# Patient Record
Sex: MALE | Race: WHITE | Employment: FULL TIME | URBAN - NONMETROPOLITAN AREA
[De-identification: names, ages, dates, MRNs, and addresses within clinical notes are randomized per-mention and may not be internally consistent; named-entity substitution may affect disease eponyms.]

---

## 2018-07-05 ENCOUNTER — OFFICE VISIT (OUTPATIENT)
Dept: FAMILY MEDICINE CLINIC | Age: 58
End: 2018-07-05
Payer: COMMERCIAL

## 2018-07-05 VITALS
SYSTOLIC BLOOD PRESSURE: 148 MMHG | HEART RATE: 72 BPM | WEIGHT: 207 LBS | HEIGHT: 64 IN | DIASTOLIC BLOOD PRESSURE: 92 MMHG | BODY MASS INDEX: 35.34 KG/M2

## 2018-07-05 DIAGNOSIS — Z12.5 SCREENING PSA (PROSTATE SPECIFIC ANTIGEN): ICD-10-CM

## 2018-07-05 DIAGNOSIS — Z12.11 ENCOUNTER FOR SCREENING COLONOSCOPY FOR NON-HIGH-RISK PATIENT: ICD-10-CM

## 2018-07-05 DIAGNOSIS — Z00.00 ENCOUNTER FOR MEDICAL EXAMINATION TO ESTABLISH CARE: Primary | ICD-10-CM

## 2018-07-05 DIAGNOSIS — E66.09 CLASS 2 OBESITY DUE TO EXCESS CALORIES WITHOUT SERIOUS COMORBIDITY WITH BODY MASS INDEX (BMI) OF 35.0 TO 35.9 IN ADULT: ICD-10-CM

## 2018-07-05 DIAGNOSIS — R10.10 PAIN OF UPPER ABDOMEN: ICD-10-CM

## 2018-07-05 DIAGNOSIS — K42.9 UMBILICAL HERNIA WITHOUT OBSTRUCTION AND WITHOUT GANGRENE: ICD-10-CM

## 2018-07-05 PROCEDURE — 99214 OFFICE O/P EST MOD 30 MIN: CPT | Performed by: FAMILY MEDICINE

## 2018-07-05 RX ORDER — IBUPROFEN 200 MG
600 TABLET ORAL PRN
COMMUNITY

## 2018-07-05 ASSESSMENT — ENCOUNTER SYMPTOMS
ALLERGIC/IMMUNOLOGIC NEGATIVE: 1
RESPIRATORY NEGATIVE: 1
EYES NEGATIVE: 1
ABDOMINAL PAIN: 1

## 2018-07-05 ASSESSMENT — PATIENT HEALTH QUESTIONNAIRE - PHQ9
SUM OF ALL RESPONSES TO PHQ9 QUESTIONS 1 & 2: 0
SUM OF ALL RESPONSES TO PHQ QUESTIONS 1-9: 0
1. LITTLE INTEREST OR PLEASURE IN DOING THINGS: 0
2. FEELING DOWN, DEPRESSED OR HOPELESS: 0

## 2018-07-05 NOTE — PROGRESS NOTES
Subjective:      Patient ID: Tr Cordova is a 59 y.o. male. HPI scheduled visit to establish care. I have reviewed the patient's medical history in detail and updated the computerized patient record. He has some concerns for an abdominal hernia. 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 Rare episodes of worsening pain after eating. Not a consistent event. Bending over sometimes aggravates this. Discomfort can last a couple minutes and then gone. No other acute concerns . Past Medical History:   Diagnosis Date    Borderline hypertension     History of rib fracture around 2000    x3, left chest wall.  Kidney stones     Osteoarthritis of left knee     Plantar fasciitis      Past Surgical History:   Procedure Laterality Date    DENTAL SURGERY      extractions     Current Outpatient Prescriptions   Medication Sig Dispense Refill    ibuprofen (ADVIL;MOTRIN) 200 MG tablet Take 600 mg by mouth as needed for Pain       No current facility-administered medications for this visit. No Known Allergies  Family History   Problem Relation Age of Onset    Diabetes Mother     Coronary Art Dis Mother     Diabetes Father     Coronary Art Dis Father     Colon Cancer Other     Colon Polyps Sister      Social History   Substance Use Topics    Smoking status: Never Smoker    Smokeless tobacco: Never Used    Alcohol use No             Review of Systems   Constitutional: Negative. HENT: Negative. Eyes: Negative. Respiratory: Negative. Cardiovascular: Negative. Gastrointestinal: Positive for abdominal pain (upper abdomen). Endocrine: Negative. Genitourinary: Negative. Musculoskeletal: Positive for arthralgias (left knee pain. intermittent). Skin: Negative. Allergic/Immunologic: Negative. Neurological: Negative. Hematological: Negative. Psychiatric/Behavioral: Negative.         Objective:   Physical Exam

## 2018-07-24 ENCOUNTER — HOSPITAL ENCOUNTER (OUTPATIENT)
Dept: LAB | Age: 58
Setting detail: SPECIMEN
Discharge: HOME | End: 2018-07-24
Payer: COMMERCIAL

## 2018-07-24 DIAGNOSIS — Z00.00 ENCOUNTER FOR MEDICAL EXAMINATION TO ESTABLISH CARE: ICD-10-CM

## 2018-07-24 DIAGNOSIS — R73.9 ELEVATED BLOOD SUGAR: Primary | ICD-10-CM

## 2018-07-24 DIAGNOSIS — Z12.5 SCREENING PSA (PROSTATE SPECIFIC ANTIGEN): ICD-10-CM

## 2018-07-24 LAB
ABSOLUTE EOS #: 0.4 K/UL (ref 0–0.4)
ABSOLUTE IMMATURE GRANULOCYTE: ABNORMAL K/UL (ref 0–0.3)
ABSOLUTE LYMPH #: 1.5 K/UL (ref 1–4.8)
ABSOLUTE MONO #: 0.6 K/UL (ref 0.1–1.2)
ALBUMIN SERPL-MCNC: 4 G/DL (ref 3.5–5.2)
ALBUMIN/GLOBULIN RATIO: 1.2 (ref 1–2.5)
ALP BLD-CCNC: 74 U/L (ref 40–129)
ALT SERPL-CCNC: 15 U/L (ref 5–41)
ANION GAP SERPL CALCULATED.3IONS-SCNC: 11 MMOL/L (ref 9–17)
AST SERPL-CCNC: 20 U/L
BASOPHILS # BLD: 1 % (ref 0–2)
BASOPHILS ABSOLUTE: 0.1 K/UL (ref 0–0.2)
BILIRUB SERPL-MCNC: 1.18 MG/DL (ref 0.3–1.2)
BUN BLDV-MCNC: 13 MG/DL (ref 6–20)
BUN/CREAT BLD: 16 (ref 9–20)
CALCIUM SERPL-MCNC: 9.2 MG/DL (ref 8.6–10.4)
CHLORIDE BLD-SCNC: 104 MMOL/L (ref 98–107)
CHOLESTEROL/HDL RATIO: 5
CHOLESTEROL: 217 MG/DL
CO2: 29 MMOL/L (ref 20–31)
CREAT SERPL-MCNC: 0.81 MG/DL (ref 0.7–1.2)
DIFFERENTIAL TYPE: ABNORMAL
EOSINOPHILS RELATIVE PERCENT: 6 % (ref 1–8)
ESTIMATED AVERAGE GLUCOSE: 120 MG/DL
GFR AFRICAN AMERICAN: >60 ML/MIN
GFR NON-AFRICAN AMERICAN: >60 ML/MIN
GFR SERPL CREATININE-BSD FRML MDRD: ABNORMAL ML/MIN/{1.73_M2}
GFR SERPL CREATININE-BSD FRML MDRD: ABNORMAL ML/MIN/{1.73_M2}
GLUCOSE BLD-MCNC: 116 MG/DL (ref 70–99)
HBA1C MFR BLD: 5.8 % (ref 4.8–5.9)
HCT VFR BLD CALC: 49.8 % (ref 41–53)
HDLC SERPL-MCNC: 43 MG/DL
HEMOGLOBIN: 17.3 G/DL (ref 13.5–17.5)
IMMATURE GRANULOCYTES: ABNORMAL %
LDL CHOLESTEROL: 155 MG/DL (ref 0–130)
LYMPHOCYTES # BLD: 23 % (ref 15–43)
MCH RBC QN AUTO: 34.1 PG (ref 26–34)
MCHC RBC AUTO-ENTMCNC: 34.8 G/DL (ref 31–37)
MCV RBC AUTO: 98.1 FL (ref 80–100)
MONOCYTES # BLD: 9 % (ref 6–14)
NRBC AUTOMATED: ABNORMAL PER 100 WBC
PDW BLD-RTO: 13.5 % (ref 11–14.5)
PLATELET # BLD: 304 K/UL (ref 140–450)
PLATELET ESTIMATE: ABNORMAL
PMV BLD AUTO: 8.8 FL (ref 6–12)
POTASSIUM SERPL-SCNC: 4 MMOL/L (ref 3.7–5.3)
PROSTATE SPECIFIC ANTIGEN: 0.36 UG/L
RBC # BLD: 5.08 M/UL (ref 4.5–5.9)
RBC # BLD: ABNORMAL 10*6/UL
SEG NEUTROPHILS: 61 % (ref 44–74)
SEGMENTED NEUTROPHILS ABSOLUTE COUNT: 4 K/UL (ref 1.8–7.7)
SODIUM BLD-SCNC: 144 MMOL/L (ref 135–144)
TOTAL PROTEIN: 7.4 G/DL (ref 6.4–8.3)
TRIGL SERPL-MCNC: 93 MG/DL
VLDLC SERPL CALC-MCNC: ABNORMAL MG/DL (ref 1–30)
WBC # BLD: 6.6 K/UL (ref 3.5–11)
WBC # BLD: ABNORMAL 10*3/UL

## 2018-07-24 PROCEDURE — 80053 COMPREHEN METABOLIC PANEL: CPT

## 2018-07-24 PROCEDURE — 85025 COMPLETE CBC W/AUTO DIFF WBC: CPT

## 2018-07-24 PROCEDURE — 80061 LIPID PANEL: CPT

## 2018-07-24 PROCEDURE — G0103 PSA SCREENING: HCPCS

## 2018-07-24 PROCEDURE — 36415 COLL VENOUS BLD VENIPUNCTURE: CPT

## 2018-07-24 PROCEDURE — 83036 HEMOGLOBIN GLYCOSYLATED A1C: CPT

## 2018-08-07 ENCOUNTER — INITIAL CONSULT (OUTPATIENT)
Dept: SURGERY | Age: 58
End: 2018-08-07
Payer: COMMERCIAL

## 2018-08-07 ENCOUNTER — TELEPHONE (OUTPATIENT)
Dept: SURGERY | Age: 58
End: 2018-08-07

## 2018-08-07 VITALS
HEART RATE: 72 BPM | SYSTOLIC BLOOD PRESSURE: 152 MMHG | BODY MASS INDEX: 34.22 KG/M2 | TEMPERATURE: 98.3 F | WEIGHT: 205.4 LBS | DIASTOLIC BLOOD PRESSURE: 78 MMHG | HEIGHT: 65 IN

## 2018-08-07 DIAGNOSIS — Z12.11 SCREENING FOR COLON CANCER: Primary | ICD-10-CM

## 2018-08-07 DIAGNOSIS — K42.9 UMBILICAL HERNIA WITHOUT OBSTRUCTION AND WITHOUT GANGRENE: ICD-10-CM

## 2018-08-07 PROCEDURE — 99203 OFFICE O/P NEW LOW 30 MIN: CPT | Performed by: SURGERY

## 2018-08-07 ASSESSMENT — ENCOUNTER SYMPTOMS
DIARRHEA: 0
VOICE CHANGE: 0
HEMATOCHEZIA: 0
VOMITING: 0
BACK PAIN: 0
CONSTIPATION: 0
RESPIRATORY NEGATIVE: 1
NAUSEA: 0
SORE THROAT: 0
TROUBLE SWALLOWING: 0
ABDOMINAL PAIN: 1
EYES NEGATIVE: 1

## 2018-08-07 ASSESSMENT — CROHNS DISEASE ACTIVITY INDEX (CDAI): CDAI SCORE: 0

## 2018-08-07 NOTE — TELEPHONE ENCOUNTER
Vital Signs (Current) [unfilled]    Weight:   Wt Readings from Last 1 Encounters:   08/07/18 205 lb 6.4 oz (93.2 kg)     Height:   Ht Readings from Last 1 Encounters:   08/07/18 5' 4.5\" (1.638 m)      BMI:  There is no height or weight on file to calculate BMI. Estimated body mass index is 34.71 kg/m² as calculated from the following:    Height as of an earlier encounter on 8/7/18: 5' 4.5\" (1.638 m). Weight as of an earlier encounter on 8/7/18: 205 lb 6.4 oz (93.2 kg). body mass index is unknown because there is no height or weight on file. Cardiac Clearance: None   Medical Clearance:None   Appointment for surgery Clearance scheduled for:None     Preoperative Testing: These are the current and completed labs:  CBC:   Lab Results   Component Value Date    WBC 6.6 07/24/2018    RBC 5.08 07/24/2018    HGB 17.3 07/24/2018    HCT 49.8 07/24/2018    MCV 98.1 07/24/2018    RDW 13.5 07/24/2018     07/24/2018     CMP:   Lab Results   Component Value Date     07/24/2018    K 4.0 07/24/2018     07/24/2018    CO2 29 07/24/2018    BUN 13 07/24/2018    CREATININE 0.81 07/24/2018    GFRAA >60 07/24/2018    LABGLOM >60 07/24/2018    GLUCOSE 116 07/24/2018    PROT 7.4 07/24/2018    CALCIUM 9.2 07/24/2018    BILITOT 1.18 07/24/2018    ALKPHOS 74 07/24/2018    AST 20 07/24/2018    ALT 15 07/24/2018     POC Tests: No results for input(s): POCGLU, POCNA, POCK, POCCL, POCBUN, POCHEMO, POCHCT in the last 72 hours.   Coags  No results found for: PROTIME, INR, APTT  HCG (If Applicable) No results found for: PREGTESTUR, PREGSERUM, HCG, HCGQUANT   ABGs No results found for: PHART, PO2ART, ESO5ASB, DCG9LKC, BEART, F1RFEYBD   Type & Screen (If Applicable)  No results found for: Hulan Daft    Additional ordered pre-operative testing:  []CBC    []ABG      [] BMP   []URINALYSIS   []CMP    []HCG   []COAGS PT/INR  []T&C  []LFTs   []TYPE AND SCREEN    [] EKG  [] Chest X-Ray  [] Other Radiology    [] Sent to Hospitalist None  [x] Sent to Anesthesia for your review: .   [] Additional Orders: None     Comments:none   Requests: No Special requests    Signed: Alfonso Curran LPN 5/0/3957 5:47 PM

## 2018-08-07 NOTE — PROGRESS NOTES
Subjective:      Patient ID: Noemy Cordova is a 59 y.o. male. Abdominal Pain   This is a new problem. The current episode started more than 1 month ago. The problem occurs every several days. The most recent episode lasted 2 hours. The problem has been waxing and waning. The pain is located in the epigastric region. The pain is mild. The quality of the pain is a sensation of fullness. The abdominal pain radiates to the chest. Pertinent negatives include no anorexia, arthralgias, constipation, diarrhea, dysuria, fever, frequency, headaches, hematochezia, hematuria, melena, nausea, vomiting or weight loss. Associated symptoms comments: Heart burn occasionally. The pain is aggravated by certain positions and movement. The pain is relieved by nothing. He has tried nothing for the symptoms. There is no history of abdominal surgery, Crohn's disease, gallstones, GERD, irritable bowel syndrome, pancreatitis, PUD or ulcerative colitis. His main concern is the possibility of a hernia in his abdomen, umbilicus. Past Medical History:   Diagnosis Date    Borderline hypertension     History of rib fracture around 2000    x3, left chest wall.  Kidney stones     Osteoarthritis of left knee     Plantar fasciitis      Past Surgical History:   Procedure Laterality Date    DENTAL SURGERY      extractions     Current Outpatient Prescriptions   Medication Sig Dispense Refill    ibuprofen (ADVIL;MOTRIN) 200 MG tablet Take 600 mg by mouth as needed for Pain       No current facility-administered medications for this visit.       No Known Allergies  Social History   Substance Use Topics    Smoking status: Never Smoker    Smokeless tobacco: Never Used    Alcohol use No     Family History   Problem Relation Age of Onset    Diabetes Mother     Coronary Art Dis Mother     Diabetes Father     Coronary Art Dis Father     Colon Cancer Other     Colon Polyps Sister      Review of Systems   Constitutional: Positive for normal. He exhibits no mass. There is no hepatosplenomegaly. There is no tenderness. A hernia is present. Musculoskeletal: Normal range of motion. Lymphadenopathy:     He has no cervical adenopathy. Neurological: He is alert and oriented to person, place, and time. Skin: Skin is warm and dry. No rash noted. He is not diaphoretic. No erythema. No pallor. Psychiatric: He has a normal mood and affect. His behavior is normal. Judgment and thought content normal.       Assessment:      1) Screening Colonoscopy  2) Umbilical Hernia      Plan:      1) Risks and benefits of colonoscopy were discussed with Srini Cordova.  In particular I discussed the possibility of incomplete colonoscopy and failure to make a diagnosis. I also discussed the risks and consequences of reactions to the sedatives, bleeding and perforation. Alternate ways of evaluating the colon including barium enema, CT colonography and sigmoidoscopy were discussed. he was also given the opportunity to have any questions answered, and encouraged to call the office with additional issues. 2) Umbilical hernia - Neha Cordova present with a UMBILICAL HERNIA mildly symptomatic. We discussed His options in detail including observation versus operative repair along with the natural history of umbilical hernias. Observation is reasonable in stable hernias that are asymptomatic or minimally symptomatic, as they may remain this way for months or years. Large hernias can cause problems with skin breakdown and can be more difficult to repair. The risk of observation is eventually developing symptoms, or the rare instance of acute incarceration and strangulation (which were described for him)    The advantages repair (control time and place of repair, improvement of symptoms, prevention of future complication etc.) were also discussed.   The risks reviewed in detail include: hernia recurrence, bleeding (particularly hematoma), infection, mesh infection (and its consequences), wound and skin problems, scarring, swelling, seroma, and chronic pain. After discussion he has decided to proceed hernia repair.

## 2018-08-13 ENCOUNTER — OFFICE VISIT (OUTPATIENT)
Dept: PRIMARY CARE CLINIC | Age: 58
End: 2018-08-13
Payer: COMMERCIAL

## 2018-08-13 VITALS
HEIGHT: 65 IN | WEIGHT: 203 LBS | BODY MASS INDEX: 33.82 KG/M2 | SYSTOLIC BLOOD PRESSURE: 154 MMHG | OXYGEN SATURATION: 98 % | TEMPERATURE: 99.4 F | DIASTOLIC BLOOD PRESSURE: 94 MMHG | HEART RATE: 72 BPM | RESPIRATION RATE: 16 BRPM

## 2018-08-13 DIAGNOSIS — J06.9 URTI (ACUTE UPPER RESPIRATORY INFECTION): Primary | ICD-10-CM

## 2018-08-13 PROCEDURE — 99213 OFFICE O/P EST LOW 20 MIN: CPT | Performed by: FAMILY MEDICINE

## 2018-08-13 ASSESSMENT — ENCOUNTER SYMPTOMS
GASTROINTESTINAL NEGATIVE: 1
EYES NEGATIVE: 1
ALLERGIC/IMMUNOLOGIC NEGATIVE: 1
RHINORRHEA: 1
COUGH: 1
SHORTNESS OF BREATH: 1

## 2018-08-13 ASSESSMENT — PATIENT HEALTH QUESTIONNAIRE - PHQ9
SUM OF ALL RESPONSES TO PHQ9 QUESTIONS 1 & 2: 0
1. LITTLE INTEREST OR PLEASURE IN DOING THINGS: 0
2. FEELING DOWN, DEPRESSED OR HOPELESS: 0
SUM OF ALL RESPONSES TO PHQ QUESTIONS 1-9: 0
SUM OF ALL RESPONSES TO PHQ QUESTIONS 1-9: 0

## 2018-08-13 NOTE — PROGRESS NOTES
sinus tenderness. Mouth/Throat: Oropharynx is clear and moist. No oropharyngeal exudate. Eyes: Conjunctivae and EOM are normal. No scleral icterus. Neck: Neck supple. No thyromegaly present. Cardiovascular: Normal rate, regular rhythm, normal heart sounds and intact distal pulses. No murmur heard. Pulmonary/Chest: Effort normal and breath sounds normal. No respiratory distress. He has no wheezes. Abdominal: Soft. Bowel sounds are normal. He exhibits no distension. There is no tenderness. There is no rebound. Musculoskeletal: Normal range of motion. He exhibits no edema or tenderness. Neurological: He is alert and oriented to person, place, and time. Skin: Skin is warm and dry. No rash noted. No erythema. Psychiatric: He has a normal mood and affect. His behavior is normal. Judgment and thought content normal.     BP (!) 154/94   Pulse 72   Temp 99.4 °F (37.4 °C)   Resp 16   Ht 5' 4.5\" (1.638 m)   Wt 203 lb (92.1 kg)   SpO2 98%   BMI 34.31 kg/m²     Assessment:      Encounter Diagnosis   Name Primary?  URTI (acute upper respiratory infection) Yes           Plan:      Viral source suspected at present. Discussed typical course, supportive care, and complications  Afrin, cough suppression, humidity. Work note x 2 days  Call with concerns or complications.           Marcelo Mckeon MD

## 2018-08-29 NOTE — H&P
and breath sounds normal. No respiratory distress. He has no wheezes. He has no rales. He exhibits no tenderness. Abdominal: Soft. Normal appearance and bowel sounds are normal. He exhibits no mass. There is no hepatosplenomegaly. There is no tenderness. A hernia is present. Musculoskeletal: Normal range of motion. Lymphadenopathy:     He has no cervical adenopathy. Neurological: He is alert and oriented to person, place, and time. Skin: Skin is warm and dry. No rash noted. He is not diaphoretic. No erythema. No pallor. Psychiatric: He has a normal mood and affect. His behavior is normal. Judgment and thought content normal.         Assessment:   1) Screening Colonoscopy  2) Umbilical Hernia                Plan:   1) Risks and benefits of colonoscopy were discussed with Carmell Ganser Art.  In particular I discussed the possibility of incomplete colonoscopy and failure to make a diagnosis. I also discussed the risks and consequences of reactions to the sedatives, bleeding and perforation. Alternate ways of evaluating the colon including barium enema, CT colonography and sigmoidoscopy were discussed. he was also given the opportunity to have any questions answered, and encouraged to call the office with additional issues. 2) Umbilical hernia - Aline Cordova present with a UMBILICAL HERNIA mildly symptomatic. We discussed His options in detail including observation versus operative repair along with the natural history of umbilical hernias. Observation is reasonable in stable hernias that are asymptomatic or minimally symptomatic, as they may remain this way for months or years. Large hernias can cause problems with skin breakdown and can be more difficult to repair.   The risk of observation is eventually developing symptoms, or the rare instance of acute incarceration and strangulation (which were described for him)     The advantages repair (control time and place of repair, improvement of symptoms,

## 2018-08-30 ENCOUNTER — ANESTHESIA (OUTPATIENT)
Dept: OPERATING ROOM | Age: 58
End: 2018-08-30
Payer: COMMERCIAL

## 2018-08-30 ENCOUNTER — HOSPITAL ENCOUNTER (OUTPATIENT)
Age: 58
Setting detail: OUTPATIENT SURGERY
Discharge: HOME | End: 2018-08-30
Attending: SURGERY | Admitting: SURGERY
Payer: COMMERCIAL

## 2018-08-30 ENCOUNTER — ANESTHESIA EVENT (OUTPATIENT)
Dept: OPERATING ROOM | Age: 58
End: 2018-08-30
Payer: COMMERCIAL

## 2018-08-30 VITALS — DIASTOLIC BLOOD PRESSURE: 56 MMHG | OXYGEN SATURATION: 94 % | SYSTOLIC BLOOD PRESSURE: 111 MMHG

## 2018-08-30 VITALS
RESPIRATION RATE: 18 BRPM | SYSTOLIC BLOOD PRESSURE: 133 MMHG | DIASTOLIC BLOOD PRESSURE: 96 MMHG | HEART RATE: 59 BPM | BODY MASS INDEX: 34.21 KG/M2 | OXYGEN SATURATION: 97 % | TEMPERATURE: 97.3 F | WEIGHT: 200.4 LBS | HEIGHT: 64 IN

## 2018-08-30 PROBLEM — Z12.11 SCREENING FOR COLON CANCER: Status: ACTIVE | Noted: 2018-08-30

## 2018-08-30 PROCEDURE — 3700000001 HC ADD 15 MINUTES (ANESTHESIA): Performed by: SURGERY

## 2018-08-30 PROCEDURE — 3609027000 HC COLONOSCOPY: Performed by: SURGERY

## 2018-08-30 PROCEDURE — G0121 COLON CA SCRN NOT HI RSK IND: HCPCS | Performed by: SURGERY

## 2018-08-30 PROCEDURE — 2580000003 HC RX 258: Performed by: SURGERY

## 2018-08-30 PROCEDURE — 7100000010 HC PHASE II RECOVERY - FIRST 15 MIN: Performed by: SURGERY

## 2018-08-30 PROCEDURE — 6360000002 HC RX W HCPCS: Performed by: NURSE ANESTHETIST, CERTIFIED REGISTERED

## 2018-08-30 PROCEDURE — 2709999900 HC NON-CHARGEABLE SUPPLY: Performed by: SURGERY

## 2018-08-30 PROCEDURE — 7100000011 HC PHASE II RECOVERY - ADDTL 15 MIN: Performed by: SURGERY

## 2018-08-30 PROCEDURE — 00812 ANES LWR INTST SCR COLSC: CPT | Performed by: NURSE ANESTHETIST, CERTIFIED REGISTERED

## 2018-08-30 PROCEDURE — 3700000000 HC ANESTHESIA ATTENDED CARE: Performed by: SURGERY

## 2018-08-30 RX ORDER — SODIUM CHLORIDE, SODIUM LACTATE, POTASSIUM CHLORIDE, CALCIUM CHLORIDE 600; 310; 30; 20 MG/100ML; MG/100ML; MG/100ML; MG/100ML
INJECTION, SOLUTION INTRAVENOUS CONTINUOUS
Status: DISCONTINUED | OUTPATIENT
Start: 2018-08-30 | End: 2018-08-30 | Stop reason: HOSPADM

## 2018-08-30 RX ORDER — PROPOFOL 10 MG/ML
INJECTION, EMULSION INTRAVENOUS PRN
Status: DISCONTINUED | OUTPATIENT
Start: 2018-08-30 | End: 2018-08-30 | Stop reason: SDUPTHER

## 2018-08-30 RX ADMIN — SODIUM CHLORIDE, POTASSIUM CHLORIDE, SODIUM LACTATE AND CALCIUM CHLORIDE: 600; 310; 30; 20 INJECTION, SOLUTION INTRAVENOUS at 09:45

## 2018-08-30 RX ADMIN — PROPOFOL 400 MG: 10 INJECTION, EMULSION INTRAVENOUS at 11:20

## 2018-08-30 RX ADMIN — SODIUM CHLORIDE, POTASSIUM CHLORIDE, SODIUM LACTATE AND CALCIUM CHLORIDE: 600; 310; 30; 20 INJECTION, SOLUTION INTRAVENOUS at 11:18

## 2018-08-30 ASSESSMENT — PAIN SCALES - GENERAL
PAINLEVEL_OUTOF10: 0
PAINLEVEL_OUTOF10: 0

## 2018-08-30 ASSESSMENT — PAIN - FUNCTIONAL ASSESSMENT: PAIN_FUNCTIONAL_ASSESSMENT: 0-10

## 2018-08-30 NOTE — BRIEF OP NOTE
Brief Postoperative Note  ______________________________________________________________    Patient: Tr Cordova  YOB: 1960  MRN: [id number]  Date of Procedure: 8/30/2018    Pre-Op Diagnosis: colon ca screening    Post-Op Diagnosis: Minimal diverticulosis       Procedure(s):  COLONOSCOPY    [facility]    Surgeon(s):  Yvonne Perez MD    Staff:  Scrub Person First: Lincolnmatt Teixeira  Scrub Person Second:  Reji Lambert     Estimated Blood Loss: none    Complications: None    Specimens:   * No specimens in log *    Implants:  * No implants in log *      Drains:      Findings: as above    [id number]    Viki Mckeon MD  Date: 8/30/2018  Time: 11:32 AM

## 2018-08-30 NOTE — ANESTHESIA PRE PROCEDURE
bowel prep,           Endo/Other:                     Abdominal:           Vascular: negative vascular ROS. Anesthesia Plan      MAC and TIVA     ASA 2     (Patient has consented to sedation/MAC anesthesia. The following information was discussed with the pt and the pt stated understanding and had no further questions. The pt will received medication to produce sleepiness and decreased awareness during surgery. The pt will be semiconscious. The expected result is a decrease in anxiety and awareness with decreased discomfort during the procedure. Common risks: N/V, slowed breathing, and awareness. Uncommon Risks: Respiratory arrest requiring advanced airway management, stroke, or death.   )  Induction: intravenous. Anesthetic plan and risks discussed with patient.       Plan discussed with surgical team.                  Kirstie Berrios, APRN - CRNA   8/30/2018

## 2018-08-30 NOTE — ANESTHESIA POSTPROCEDURE EVALUATION
Department of Anesthesiology  Postprocedure Note    Patient: More Cordova  MRN: [id number]  YOB: 1960  Date of evaluation: 8/30/2018  Time:  11:36 AM     Procedure Summary     Date:  08/30/18 Room / Location:  [address] OR    Anesthesia Start:  1107 Anesthesia Stop:  3793    Procedure:  COLONOSCOPY (N/A ) Diagnosis:  (colon ca screening)    Surgeon:  Kiki Thomason MD Responsible Provider:  MARIE Bell CRNA    Anesthesia Type:  MAC, TIVA ASA Status:  2          Anesthesia Type: MAC, TIVA    Janay Phase I: Janay Score: 10    Janay Phase II:      Last vitals: Reviewed and per EMR flowsheets.        Anesthesia Post Evaluation    Patient location during evaluation: PACU  Patient participation: complete - patient participated  Level of consciousness: awake and alert  Pain score: 0  Airway patency: patent  Nausea & Vomiting: no nausea and no vomiting  Complications: no  Cardiovascular status: blood pressure returned to baseline and hemodynamically stable  Respiratory status: acceptable, spontaneous ventilation and room air  Hydration status: euvolemic

## 2018-08-30 NOTE — INTERVAL H&P NOTE
Seen pre-op  History review  Brief physical preformed. Questions answered  No change in status unless other documented.

## 2018-09-10 ENCOUNTER — HOSPITAL ENCOUNTER (OUTPATIENT)
Age: 58
Setting detail: OUTPATIENT SURGERY
Discharge: HOME | End: 2018-09-10
Attending: SURGERY | Admitting: SURGERY
Payer: COMMERCIAL

## 2018-09-10 ENCOUNTER — ANESTHESIA (OUTPATIENT)
Dept: OPERATING ROOM | Age: 58
End: 2018-09-10
Payer: COMMERCIAL

## 2018-09-10 ENCOUNTER — ANESTHESIA EVENT (OUTPATIENT)
Dept: OPERATING ROOM | Age: 58
End: 2018-09-10
Payer: COMMERCIAL

## 2018-09-10 VITALS
HEIGHT: 64 IN | WEIGHT: 202 LBS | SYSTOLIC BLOOD PRESSURE: 147 MMHG | OXYGEN SATURATION: 97 % | RESPIRATION RATE: 16 BRPM | HEART RATE: 62 BPM | BODY MASS INDEX: 34.49 KG/M2 | DIASTOLIC BLOOD PRESSURE: 86 MMHG | TEMPERATURE: 97.1 F

## 2018-09-10 VITALS
RESPIRATION RATE: 19 BRPM | DIASTOLIC BLOOD PRESSURE: 54 MMHG | OXYGEN SATURATION: 96 % | SYSTOLIC BLOOD PRESSURE: 103 MMHG

## 2018-09-10 DIAGNOSIS — K42.9 UMBILICAL HERNIA WITHOUT OBSTRUCTION AND WITHOUT GANGRENE: Primary | ICD-10-CM

## 2018-09-10 DIAGNOSIS — G89.18 POST-OP PAIN: ICD-10-CM

## 2018-09-10 PROBLEM — T17.208A OROPHARYNGEAL ASPIRATION: Status: ACTIVE | Noted: 2018-09-10

## 2018-09-10 PROCEDURE — 2580000003 HC RX 258: Performed by: SURGERY

## 2018-09-10 PROCEDURE — 6360000002 HC RX W HCPCS: Performed by: SURGERY

## 2018-09-10 PROCEDURE — 3700000000 HC ANESTHESIA ATTENDED CARE: Performed by: SURGERY

## 2018-09-10 PROCEDURE — 3600000003 HC SURGERY LEVEL 3 BASE: Performed by: SURGERY

## 2018-09-10 PROCEDURE — 7100000011 HC PHASE II RECOVERY - ADDTL 15 MIN: Performed by: SURGERY

## 2018-09-10 PROCEDURE — C1781 MESH (IMPLANTABLE): HCPCS | Performed by: SURGERY

## 2018-09-10 PROCEDURE — 6360000002 HC RX W HCPCS

## 2018-09-10 PROCEDURE — 49585 REPAIR UMBILICAL HERN,5+Y/O,REDUC: CPT | Performed by: SURGERY

## 2018-09-10 PROCEDURE — 00750 ANES HRNA RPR UPR ABD NOS: CPT | Performed by: NURSE ANESTHETIST, CERTIFIED REGISTERED

## 2018-09-10 PROCEDURE — 3700000001 HC ADD 15 MINUTES (ANESTHESIA): Performed by: SURGERY

## 2018-09-10 PROCEDURE — 2709999900 HC NON-CHARGEABLE SUPPLY: Performed by: SURGERY

## 2018-09-10 PROCEDURE — 6360000002 HC RX W HCPCS: Performed by: NURSE ANESTHETIST, CERTIFIED REGISTERED

## 2018-09-10 PROCEDURE — 3600000013 HC SURGERY LEVEL 3 ADDTL 15MIN: Performed by: SURGERY

## 2018-09-10 PROCEDURE — 2500000003 HC RX 250 WO HCPCS: Performed by: SURGERY

## 2018-09-10 PROCEDURE — 7100000010 HC PHASE II RECOVERY - FIRST 15 MIN: Performed by: SURGERY

## 2018-09-10 DEVICE — PATCH HERN M DIA2.5IN CIR W/ STRP SEPRA TECHNOLOGY ABSRB: Type: IMPLANTABLE DEVICE | Site: ABDOMEN | Status: FUNCTIONAL

## 2018-09-10 RX ORDER — MIDAZOLAM HYDROCHLORIDE 1 MG/ML
INJECTION INTRAMUSCULAR; INTRAVENOUS PRN
Status: DISCONTINUED | OUTPATIENT
Start: 2018-09-10 | End: 2018-09-10 | Stop reason: SDUPTHER

## 2018-09-10 RX ORDER — HYDROCODONE BITARTRATE AND ACETAMINOPHEN 5; 325 MG/1; MG/1
1 TABLET ORAL EVERY 6 HOURS PRN
Qty: 30 TABLET | Refills: 0 | Status: SHIPPED | OUTPATIENT
Start: 2018-09-10 | End: 2018-09-17

## 2018-09-10 RX ORDER — FENTANYL CITRATE 50 UG/ML
INJECTION, SOLUTION INTRAMUSCULAR; INTRAVENOUS PRN
Status: DISCONTINUED | OUTPATIENT
Start: 2018-09-10 | End: 2018-09-10 | Stop reason: SDUPTHER

## 2018-09-10 RX ORDER — PROPOFOL 10 MG/ML
INJECTION, EMULSION INTRAVENOUS CONTINUOUS PRN
Status: DISCONTINUED | OUTPATIENT
Start: 2018-09-10 | End: 2018-09-10 | Stop reason: SDUPTHER

## 2018-09-10 RX ORDER — KETOROLAC TROMETHAMINE 30 MG/ML
INJECTION, SOLUTION INTRAMUSCULAR; INTRAVENOUS PRN
Status: DISCONTINUED | OUTPATIENT
Start: 2018-09-10 | End: 2018-09-10 | Stop reason: SDUPTHER

## 2018-09-10 RX ORDER — DEXAMETHASONE SODIUM PHOSPHATE 4 MG/ML
INJECTION, SOLUTION INTRA-ARTICULAR; INTRALESIONAL; INTRAMUSCULAR; INTRAVENOUS; SOFT TISSUE PRN
Status: DISCONTINUED | OUTPATIENT
Start: 2018-09-10 | End: 2018-09-10 | Stop reason: SDUPTHER

## 2018-09-10 RX ORDER — PROPOFOL 10 MG/ML
INJECTION, EMULSION INTRAVENOUS PRN
Status: DISCONTINUED | OUTPATIENT
Start: 2018-09-10 | End: 2018-09-10 | Stop reason: SDUPTHER

## 2018-09-10 RX ORDER — SODIUM CHLORIDE, SODIUM LACTATE, POTASSIUM CHLORIDE, CALCIUM CHLORIDE 600; 310; 30; 20 MG/100ML; MG/100ML; MG/100ML; MG/100ML
INJECTION, SOLUTION INTRAVENOUS CONTINUOUS
Status: DISCONTINUED | OUTPATIENT
Start: 2018-09-10 | End: 2018-09-10 | Stop reason: HOSPADM

## 2018-09-10 RX ADMIN — KETOROLAC TROMETHAMINE 30 MG: 30 INJECTION, SOLUTION INTRAMUSCULAR at 08:29

## 2018-09-10 RX ADMIN — SODIUM CHLORIDE, POTASSIUM CHLORIDE, SODIUM LACTATE AND CALCIUM CHLORIDE: 600; 310; 30; 20 INJECTION, SOLUTION INTRAVENOUS at 07:01

## 2018-09-10 RX ADMIN — MIDAZOLAM HYDROCHLORIDE 2 MG: 1 INJECTION, SOLUTION INTRAMUSCULAR; INTRAVENOUS at 07:47

## 2018-09-10 RX ADMIN — Medication 2 G: at 07:54

## 2018-09-10 RX ADMIN — PROPOFOL 50 MG: 10 INJECTION, EMULSION INTRAVENOUS at 07:54

## 2018-09-10 RX ADMIN — FENTANYL CITRATE 100 MCG: 50 INJECTION, SOLUTION INTRAMUSCULAR; INTRAVENOUS at 07:52

## 2018-09-10 RX ADMIN — SODIUM CHLORIDE, POTASSIUM CHLORIDE, SODIUM LACTATE AND CALCIUM CHLORIDE: 600; 310; 30; 20 INJECTION, SOLUTION INTRAVENOUS at 07:47

## 2018-09-10 RX ADMIN — DEXAMETHASONE SODIUM PHOSPHATE 4 MG: 4 INJECTION, SOLUTION INTRAMUSCULAR; INTRAVENOUS at 08:29

## 2018-09-10 RX ADMIN — PROPOFOL 150 MCG/KG/MIN: 10 INJECTION, EMULSION INTRAVENOUS at 07:54

## 2018-09-10 ASSESSMENT — PAIN DESCRIPTION - LOCATION
LOCATION: ABDOMEN

## 2018-09-10 ASSESSMENT — PAIN DESCRIPTION - ORIENTATION
ORIENTATION: MID

## 2018-09-10 ASSESSMENT — PAIN SCALES - GENERAL
PAINLEVEL_OUTOF10: 3
PAINLEVEL_OUTOF10: 4
PAINLEVEL_OUTOF10: 0
PAINLEVEL_OUTOF10: 3
PAINLEVEL_OUTOF10: 3

## 2018-09-10 ASSESSMENT — PAIN - FUNCTIONAL ASSESSMENT: PAIN_FUNCTIONAL_ASSESSMENT: 0-10

## 2018-09-10 NOTE — BRIEF OP NOTE
Brief Postoperative Note  ______________________________________________________________    Patient: Robert Cordova  YOB: 1960  MRN: [id number]  Date of Procedure: 9/10/2018    Pre-Op Diagnosis: Umbilical hernia    Post-Op Diagnosis: Same       Procedure(s):  Umbilical Hernia repair w/ Mesh    [facility]esthesia: [facility]    Surgeon(s):  Griselda Fox MD    Staff:  Scrub Person First: Sonam Gomes     Estimated Blood Loss: 5 ml  Complications: None    Specimens:   * No specimens in log *    Implants:    Implant Name Type Inv.  Item Serial No.  Lot No. LRB No. Used   PATCH RICARDO [address] ST W/STRAP CIR MED 6.4CM Mesh PATCH RICARDO [address]TRALEX ST W/STRAP CIR MED 6.4CM   CR [employer] [id number] N/A 1         Drains:      Findings: umbilical hernia    [id number]    Alyse Hammond MD  Date: 9/10/2018  Time: 8:50 AM

## 2018-09-10 NOTE — ANESTHESIA PRE PROCEDURE
Department of Anesthesiology  Preprocedure Note       Name:  More Cordova   Age:  59 y.o.  :  1960                                          MRN:  [id number]         Date:  9/10/2018      Surgeon: Violet Olsen):  Kiki Thomason MD    Procedure: Procedure(s):Umbilical Hernia repair w/Poss Mesh      Medications prior to admission:   Prior to Admission medications    Medication Sig Start Date End Date Taking? Authorizing Provider   ibuprofen (ADVIL;MOTRIN) 200 MG tablet Take 600 mg by mouth as needed for Pain    Historical Provider, MD       Current medications:    No current facility-administered medications for this visit. No current outpatient prescriptions on file. Facility-Administered Medications Ordered in Other Visits   Medication Dose Route Frequency Provider Last Rate Last Dose    lactated ringers infusion   Intravenous Continuous Kiki Thomason  mL/hr at 09/10/18 0701      ceFAZolin (ANCEF) 2 g in sterile water 20 mL IV syringe  2 g Intravenous On Call to Alison Levine MD           Allergies:  No Known Allergies    Problem List:    Patient Active Problem List   Diagnosis Code    Screening for colon cancer Z12.11       Past Medical History:        Diagnosis Date    Borderline hypertension     History of rib fracture around 2000    x3, left chest wall.  Kidney stones     Osteoarthritis of left knee     Plantar fasciitis        Past Surgical History:        Procedure Laterality Date    DENTAL SURGERY      extractions    [place] COLONOSCOPY FLX DX W/COLLJ SPEC WHEN PFRMD N/A 2018    COLONOSCOPY performed by Kiki Thomason MD at [place] [address] History:    Social History   Substance Use Topics    Smoking status: Never Smoker    Smokeless tobacco: Never Used    Alcohol use No                                Counseling given: Not Answered      Vital Signs (Current): There were no vitals filed for this visit.                                            BP Readings

## 2018-09-18 ENCOUNTER — OFFICE VISIT (OUTPATIENT)
Dept: SURGERY | Age: 58
End: 2018-09-18

## 2018-09-18 VITALS
DIASTOLIC BLOOD PRESSURE: 78 MMHG | HEART RATE: 60 BPM | BODY MASS INDEX: 35.24 KG/M2 | WEIGHT: 206.4 LBS | TEMPERATURE: 98.6 F | HEIGHT: 64 IN | SYSTOLIC BLOOD PRESSURE: 130 MMHG

## 2018-09-18 DIAGNOSIS — K42.9 UMBILICAL HERNIA WITHOUT OBSTRUCTION AND WITHOUT GANGRENE: Primary | ICD-10-CM

## 2018-09-18 PROCEDURE — 99024 POSTOP FOLLOW-UP VISIT: CPT | Performed by: SURGERY

## 2018-09-18 NOTE — LETTER
[address] [facility]  Phone: [phone]  Fax: [phone]    Viki Mckeon MD        September 18, 2018     Patient: Tr Cordova   YOB: 1960   Date of Visit: 9/18/2018       To Whom It May Concern: It is my medical opinion that Chela Cordova was off from 9/10/18 to 9/24/18. May to return on 9/24/18 with no restrictions. .    If you have any questions or concerns, please don't hesitate to call.     Sincerely,        Viki Mckeon MD

## 2018-09-18 NOTE — LETTER
[address]  Phone: [phone]  Fax: [phone]    Alix Uribe MD        September 18, 2018     Patient: Noemy Cordova   YOB: 1960   Date of Visit: 9/18/2018       To Whom It May Concern: It is my medical opinion that Katia Cordova may return to work on 9/24/18 with no restrictions. .    If you have any questions or concerns, please don't hesitate to call.     Sincerely,        Alix Uribe MD

## 2018-09-24 ENCOUNTER — TELEPHONE (OUTPATIENT)
Dept: SURGERY | Age: 58
End: 2018-09-24

## 2018-09-29 PROBLEM — Z12.11 SCREENING FOR COLON CANCER: Status: RESOLVED | Noted: 2018-08-30 | Resolved: 2018-09-29

## 2018-10-05 ENCOUNTER — HOSPITAL ENCOUNTER (EMERGENCY)
Age: 58
Discharge: HOME | End: 2018-10-05
Attending: EMERGENCY MEDICINE
Payer: COMMERCIAL

## 2018-10-05 ENCOUNTER — APPOINTMENT (OUTPATIENT)
Dept: CT IMAGING | Age: 58
End: 2018-10-05
Payer: COMMERCIAL

## 2018-10-05 ENCOUNTER — TELEPHONE (OUTPATIENT)
Dept: SURGERY | Age: 58
End: 2018-10-05

## 2018-10-05 VITALS
TEMPERATURE: 97.7 F | OXYGEN SATURATION: 98 % | HEIGHT: 64 IN | DIASTOLIC BLOOD PRESSURE: 77 MMHG | SYSTOLIC BLOOD PRESSURE: 160 MMHG | HEART RATE: 58 BPM | RESPIRATION RATE: 16 BRPM | WEIGHT: 202 LBS | BODY MASS INDEX: 34.49 KG/M2

## 2018-10-05 DIAGNOSIS — N20.0 KIDNEY STONE: ICD-10-CM

## 2018-10-05 DIAGNOSIS — R07.9 CHEST PAIN, UNSPECIFIED TYPE: Primary | ICD-10-CM

## 2018-10-05 LAB
-: ABNORMAL
ABSOLUTE EOS #: 0.3 K/UL (ref 0–0.4)
ABSOLUTE IMMATURE GRANULOCYTE: NORMAL K/UL (ref 0–0.3)
ABSOLUTE LYMPH #: 1.8 K/UL (ref 1–4.8)
ABSOLUTE MONO #: 0.7 K/UL (ref 0.1–1.2)
ALBUMIN SERPL-MCNC: 4.1 G/DL (ref 3.5–5.2)
ALBUMIN/GLOBULIN RATIO: 1.3 (ref 1–2.5)
ALP BLD-CCNC: 78 U/L (ref 40–129)
ALT SERPL-CCNC: 17 U/L (ref 5–41)
AMORPHOUS: ABNORMAL
AMYLASE: 35 U/L (ref 28–100)
ANION GAP SERPL CALCULATED.3IONS-SCNC: 7 MMOL/L (ref 9–17)
AST SERPL-CCNC: 23 U/L
BACTERIA: ABNORMAL
BASOPHILS # BLD: 1 % (ref 0–2)
BASOPHILS ABSOLUTE: 0.1 K/UL (ref 0–0.2)
BILIRUB SERPL-MCNC: 1.26 MG/DL (ref 0.3–1.2)
BILIRUBIN DIRECT: 0.24 MG/DL
BILIRUBIN URINE: NEGATIVE
BILIRUBIN, INDIRECT: 1.02 MG/DL (ref 0–1)
BUN BLDV-MCNC: 12 MG/DL (ref 8–23)
BUN/CREAT BLD: 14 (ref 9–20)
CALCIUM SERPL-MCNC: 8.9 MG/DL (ref 8.6–10.4)
CASTS UA: ABNORMAL /LPF (ref 0–2)
CHLORIDE BLD-SCNC: 106 MMOL/L (ref 98–107)
CO2: 28 MMOL/L (ref 20–31)
COLOR: ABNORMAL
COMMENT UA: ABNORMAL
CREAT SERPL-MCNC: 0.83 MG/DL (ref 0.7–1.2)
CRYSTALS, UA: ABNORMAL /HPF
DIFFERENTIAL TYPE: NORMAL
EOSINOPHILS RELATIVE PERCENT: 5 % (ref 1–8)
EPITHELIAL CELLS UA: ABNORMAL /HPF (ref 0–5)
GFR AFRICAN AMERICAN: >60 ML/MIN
GFR NON-AFRICAN AMERICAN: >60 ML/MIN
GFR SERPL CREATININE-BSD FRML MDRD: ABNORMAL ML/MIN/{1.73_M2}
GFR SERPL CREATININE-BSD FRML MDRD: ABNORMAL ML/MIN/{1.73_M2}
GLOBULIN: 3.2 G/DL (ref 1.5–3.8)
GLUCOSE BLD-MCNC: 113 MG/DL (ref 70–99)
GLUCOSE URINE: NEGATIVE
HCT VFR BLD CALC: 46.5 % (ref 41–53)
HEMOGLOBIN: 15.7 G/DL (ref 13.5–17.5)
IMMATURE GRANULOCYTES: NORMAL %
KETONES, URINE: NEGATIVE
LEUKOCYTE ESTERASE, URINE: NEGATIVE
LIPASE: 20 U/L (ref 13–60)
LYMPHOCYTES # BLD: 30 % (ref 15–43)
MCH RBC QN AUTO: 33 PG (ref 26–34)
MCHC RBC AUTO-ENTMCNC: 33.7 G/DL (ref 31–37)
MCV RBC AUTO: 98 FL (ref 80–100)
MONOCYTES # BLD: 11 % (ref 6–14)
MUCUS: ABNORMAL
NITRITE, URINE: NEGATIVE
NRBC AUTOMATED: NORMAL PER 100 WBC
OTHER OBSERVATIONS UA: ABNORMAL
PDW BLD-RTO: 13.7 % (ref 11–14.5)
PH UA: 6.5 (ref 5–6)
PLATELET # BLD: 304 K/UL (ref 140–450)
PLATELET ESTIMATE: NORMAL
PMV BLD AUTO: 8.9 FL (ref 6–12)
POTASSIUM SERPL-SCNC: 3.8 MMOL/L (ref 3.7–5.3)
PROTEIN UA: ABNORMAL
RBC # BLD: 4.75 M/UL (ref 4.5–5.9)
RBC # BLD: NORMAL 10*6/UL
RBC UA: ABNORMAL /HPF (ref 0–4)
RENAL EPITHELIAL, UA: ABNORMAL /HPF
SEG NEUTROPHILS: 53 % (ref 44–74)
SEGMENTED NEUTROPHILS ABSOLUTE COUNT: 3.3 K/UL (ref 1.8–7.7)
SODIUM BLD-SCNC: 141 MMOL/L (ref 135–144)
SPECIFIC GRAVITY UA: 1.01 (ref 1.01–1.02)
TOTAL PROTEIN: 7.3 G/DL (ref 6.4–8.3)
TRICHOMONAS: ABNORMAL
TROPONIN INTERP: NORMAL
TROPONIN INTERP: NORMAL
TROPONIN T: <0.03 NG/ML
TROPONIN T: <0.03 NG/ML
TURBIDITY: ABNORMAL
URINE HGB: ABNORMAL
UROBILINOGEN, URINE: NORMAL
WBC # BLD: 6.2 K/UL (ref 3.5–11)
WBC # BLD: NORMAL 10*3/UL
WBC UA: ABNORMAL /HPF (ref 0–4)
YEAST: ABNORMAL

## 2018-10-05 PROCEDURE — 6360000002 HC RX W HCPCS: Performed by: EMERGENCY MEDICINE

## 2018-10-05 PROCEDURE — 99285 EMERGENCY DEPT VISIT HI MDM: CPT

## 2018-10-05 PROCEDURE — 81001 URINALYSIS AUTO W/SCOPE: CPT

## 2018-10-05 PROCEDURE — 83690 ASSAY OF LIPASE: CPT

## 2018-10-05 PROCEDURE — 96361 HYDRATE IV INFUSION ADD-ON: CPT

## 2018-10-05 PROCEDURE — 36415 COLL VENOUS BLD VENIPUNCTURE: CPT

## 2018-10-05 PROCEDURE — 74177 CT ABD & PELVIS W/CONTRAST: CPT

## 2018-10-05 PROCEDURE — 80076 HEPATIC FUNCTION PANEL: CPT

## 2018-10-05 PROCEDURE — 80048 BASIC METABOLIC PNL TOTAL CA: CPT

## 2018-10-05 PROCEDURE — 6360000004 HC RX CONTRAST MEDICATION: Performed by: EMERGENCY MEDICINE

## 2018-10-05 PROCEDURE — 82150 ASSAY OF AMYLASE: CPT

## 2018-10-05 PROCEDURE — 93005 ELECTROCARDIOGRAM TRACING: CPT

## 2018-10-05 PROCEDURE — 2709999900 CT CHEST PULMONARY EMBOLISM W CONTRAST

## 2018-10-05 PROCEDURE — 85025 COMPLETE CBC W/AUTO DIFF WBC: CPT

## 2018-10-05 PROCEDURE — 6370000000 HC RX 637 (ALT 250 FOR IP): Performed by: EMERGENCY MEDICINE

## 2018-10-05 PROCEDURE — 96365 THER/PROPH/DIAG IV INF INIT: CPT

## 2018-10-05 PROCEDURE — 84484 ASSAY OF TROPONIN QUANT: CPT

## 2018-10-05 PROCEDURE — 2580000003 HC RX 258: Performed by: EMERGENCY MEDICINE

## 2018-10-05 RX ORDER — ASPIRIN 81 MG/1
324 TABLET, CHEWABLE ORAL ONCE
Status: COMPLETED | OUTPATIENT
Start: 2018-10-05 | End: 2018-10-05

## 2018-10-05 RX ORDER — 0.9 % SODIUM CHLORIDE 0.9 %
500 INTRAVENOUS SOLUTION INTRAVENOUS ONCE
Status: COMPLETED | OUTPATIENT
Start: 2018-10-05 | End: 2018-10-05

## 2018-10-05 RX ADMIN — SODIUM CHLORIDE 500 ML: 9 INJECTION, SOLUTION INTRAVENOUS at 13:32

## 2018-10-05 RX ADMIN — IOPAMIDOL 100 ML: 755 INJECTION, SOLUTION INTRAVENOUS at 14:14

## 2018-10-05 RX ADMIN — CEFTRIAXONE 1 G: 1 INJECTION, POWDER, FOR SOLUTION INTRAMUSCULAR; INTRAVENOUS at 15:10

## 2018-10-05 RX ADMIN — ASPIRIN 81 MG 324 MG: 81 TABLET ORAL at 13:28

## 2018-10-05 ASSESSMENT — PAIN DESCRIPTION - PROGRESSION: CLINICAL_PROGRESSION: NOT CHANGED

## 2018-10-05 ASSESSMENT — PAIN DESCRIPTION - LOCATION: LOCATION: ABDOMEN;CHEST

## 2018-10-05 ASSESSMENT — PAIN SCALES - GENERAL: PAINLEVEL_OUTOF10: 3

## 2018-10-05 NOTE — ED NOTES
Dr. Tello Su conferenced with Dr. Patrick Head via phone call. Report to [employer] (Closed).      Josesito Gibbons RN  10/05/18 7332

## 2018-10-05 NOTE — TELEPHONE ENCOUNTER
Patient called into the office. Recently he had a hernia surgery with Dr. Krishnan Doing for umbilical hernia repair with mesh on 9/10/18. He has returned to work at [employer] and his having a few concerns that he would like to discuss with the nurse. While at work, he is having a [place] chest\" feeling and the abdominal site appears to be sore, red and bruised. No fever or discharge from incision site. Pain is about 7 out of 10 once he gets home from work but when not at work it is a 3. 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 He is wondering if his concerns are normal and what he may do to help relieve them. He does have to work today at 3:00 PM and would like a call back if possible before he goes in. Please call him back at [phone].

## 2018-10-05 NOTE — ED PROVIDER NOTES
BPM    Atrial Rate 62 BPM    P-R Interval 190 ms    QRS Duration 82 ms    Q-T Interval 456 ms    QTc Calculation (Bazett) 462 ms    P Axis 64 degrees    R Axis 45 degrees    T Axis 64 degrees   EKG 12 Lead   Result Value Ref Range    Ventricular Rate 57 BPM    Atrial Rate 57 BPM    P-R Interval 188 ms    QRS Duration 78 ms    Q-T Interval 482 ms    QTc Calculation (Bazett) 469 ms    P Axis 42 degrees    R Axis 11 degrees    T Axis 35 degrees         EMERGENCY DEPARTMENT COURSE:   Vitals:    Vitals:    10/05/18 1334 10/05/18 1453 10/05/18 1627 10/05/18 1629   BP: (!) 159/87 (!) 145/74 (!) 160/77    Pulse: 60 62 60    Resp: 20 20  19   Temp:       TempSrc:       SpO2: 99% 99% 99%    Weight:       Height:         -------------------------  BP: (!) 160/77, Temp: 97.7 °F (36.5 °C), Pulse: 60, Resp: 19      Re-evaluation Notes    After careful consideration, the patient does not wish to stay or be transferred elsewhere. He would like to go home and follow-up with his PCP about his chest pain. He is not having chest pain anymore. He says it felt more like heartburn. I explained that while I don't think he is having a PE, I cannot rule out heart disease. He understands that he risks heart attack or death. The patient does not appear to have an obstructing stone at this time. I did give him some antibiotics but the urinalysis is benign. I spoke with the urologist who feels the patient can have an outpatient workup. The patient is discharged. He appears to have full capacity for decision making and no distracting conditions. He is discharged in good condition. CONSULTS:    12  Discussed with Dr. Rl Chen. Advised of negative post-op surgical findings. [address] paged. Suggests going elsewhere for chest pain rule out and possible u[facility] procedure. [facility] [facility]  Discussed with Dr. Villa Marie. Does not need urgent intervention.   Can be seen in office on Monday or Wednesday, but if

## 2018-10-07 LAB
EKG ATRIAL RATE: 57 BPM
EKG ATRIAL RATE: 62 BPM
EKG P AXIS: 42 DEGREES
EKG P AXIS: 64 DEGREES
EKG P-R INTERVAL: 188 MS
EKG P-R INTERVAL: 190 MS
EKG Q-T INTERVAL: 456 MS
EKG Q-T INTERVAL: 482 MS
EKG QRS DURATION: 78 MS
EKG QRS DURATION: 82 MS
EKG QTC CALCULATION (BAZETT): 462 MS
EKG QTC CALCULATION (BAZETT): 469 MS
EKG R AXIS: 11 DEGREES
EKG R AXIS: 45 DEGREES
EKG T AXIS: 35 DEGREES
EKG T AXIS: 64 DEGREES
EKG VENTRICULAR RATE: 57 BPM
EKG VENTRICULAR RATE: 62 BPM

## 2018-11-08 ENCOUNTER — HOSPITAL ENCOUNTER (OUTPATIENT)
Dept: GENERAL RADIOLOGY | Age: 58
Discharge: HOME | End: 2018-11-10
Payer: COMMERCIAL

## 2018-11-08 ENCOUNTER — OFFICE VISIT (OUTPATIENT)
Dept: PRIMARY CARE CLINIC | Age: 58
End: 2018-11-08
Payer: COMMERCIAL

## 2018-11-08 VITALS
TEMPERATURE: 98.8 F | HEIGHT: 64 IN | SYSTOLIC BLOOD PRESSURE: 158 MMHG | DIASTOLIC BLOOD PRESSURE: 94 MMHG | RESPIRATION RATE: 16 BRPM | WEIGHT: 208.6 LBS | HEART RATE: 62 BPM | OXYGEN SATURATION: 98 % | BODY MASS INDEX: 35.61 KG/M2

## 2018-11-08 DIAGNOSIS — M79.672 LEFT FOOT PAIN: ICD-10-CM

## 2018-11-08 DIAGNOSIS — S82.892A AVULSION FRACTURE OF ANKLE, LEFT, CLOSED, INITIAL ENCOUNTER: Primary | ICD-10-CM

## 2018-11-08 DIAGNOSIS — M25.572 ACUTE LEFT ANKLE PAIN: ICD-10-CM

## 2018-11-08 PROCEDURE — 73630 X-RAY EXAM OF FOOT: CPT

## 2018-11-08 PROCEDURE — L4387 NON-PNEUM WALK BOOT PRE OTS: HCPCS | Performed by: NURSE PRACTITIONER

## 2018-11-08 PROCEDURE — 73610 X-RAY EXAM OF ANKLE: CPT

## 2018-11-08 PROCEDURE — 99214 OFFICE O/P EST MOD 30 MIN: CPT | Performed by: NURSE PRACTITIONER

## 2018-11-08 ASSESSMENT — ENCOUNTER SYMPTOMS: RESPIRATORY NEGATIVE: 1

## 2018-11-08 NOTE — PROGRESS NOTES
[facility]             [address], [address]                        Telephone [phone]             Fax [phone]     Ev Simms  1960  MNX:[id number]   Date of visit:  11/8/2018    Subjective:    Josh Cordova is a 59 y.o.  male who presents to [facility] today (11/8/2018) for evaluation of:    Chief Complaint   Patient presents with    Ankle Pain     left sided ankle/foot pain after fell down stairs today. pain is on the top/lateral side of the foot        Ankle Pain    The incident occurred 3 to 6 hours ago. The incident occurred at home. The injury mechanism was a fall. The pain is present in the left ankle. The pain is at a severity of 4/10. The pain has been constant since onset. Associated symptoms include an inability to bear weight. Pertinent negatives include no loss of motion, loss of sensation, muscle weakness, numbness or tingling. The symptoms are aggravated by palpation, weight bearing and movement. He has tried nothing for the symptoms. The treatment provided no relief. He has the following problem list:  Patient Active Problem List   Diagnosis    Umbilical hernia    Oropharyngeal aspiration        Current medications are:  Current Outpatient Prescriptions   Medication Sig Dispense Refill    ibuprofen (ADVIL;MOTRIN) 200 MG tablet Take 600 mg by mouth as needed for Pain       No current facility-administered medications for this visit. He has No Known Allergies. Kaylie Bar He  reports that he has never smoked. He has never used smokeless tobacco.      Objective:    Vitals:    11/08/18 1120   BP: (!) 158/94   Pulse:    Resp:    Temp:    SpO2:      Body mass index is 35.81 kg/m². Review of Systems   Constitutional: Negative. Respiratory: Negative. Cardiovascular: Negative.     Musculoskeletal:        Left outer ankle pain   Neurological: Negative for tingling and

## 2018-11-13 ENCOUNTER — OFFICE VISIT (OUTPATIENT)
Dept: ORTHOPEDIC SURGERY | Age: 58
End: 2018-11-13
Payer: COMMERCIAL

## 2018-11-13 VITALS
HEART RATE: 66 BPM | DIASTOLIC BLOOD PRESSURE: 88 MMHG | BODY MASS INDEX: 35.51 KG/M2 | WEIGHT: 208 LBS | SYSTOLIC BLOOD PRESSURE: 148 MMHG | HEIGHT: 64 IN

## 2018-11-13 DIAGNOSIS — S82.892A AVULSION FRACTURE OF ANKLE, LEFT, CLOSED, INITIAL ENCOUNTER: Primary | ICD-10-CM

## 2018-11-13 PROCEDURE — 27786 TREATMENT OF ANKLE FRACTURE: CPT | Performed by: PHYSICIAN ASSISTANT

## 2018-11-29 DIAGNOSIS — S82.892S CLOSED AVULSION FRACTURE OF LEFT ANKLE, SEQUELA: Primary | ICD-10-CM

## 2018-12-04 ENCOUNTER — OFFICE VISIT (OUTPATIENT)
Dept: ORTHOPEDIC SURGERY | Age: 58
End: 2018-12-04

## 2018-12-04 ENCOUNTER — HOSPITAL ENCOUNTER (OUTPATIENT)
Dept: GENERAL RADIOLOGY | Age: 58
Discharge: HOME | End: 2018-12-06
Payer: COMMERCIAL

## 2018-12-04 VITALS
SYSTOLIC BLOOD PRESSURE: 138 MMHG | BODY MASS INDEX: 35.51 KG/M2 | HEIGHT: 64 IN | WEIGHT: 208 LBS | HEART RATE: 80 BPM | DIASTOLIC BLOOD PRESSURE: 82 MMHG

## 2018-12-04 DIAGNOSIS — S82.892S CLOSED AVULSION FRACTURE OF LEFT ANKLE, SEQUELA: ICD-10-CM

## 2018-12-04 DIAGNOSIS — S82.892S CLOSED AVULSION FRACTURE OF LEFT ANKLE, SEQUELA: Primary | ICD-10-CM

## 2018-12-04 PROCEDURE — 73610 X-RAY EXAM OF ANKLE: CPT

## 2018-12-04 PROCEDURE — 99024 POSTOP FOLLOW-UP VISIT: CPT | Performed by: PHYSICIAN ASSISTANT

## (undated) DEVICE — 1200CC GUARDIAN II: Brand: GUARDIAN

## (undated) DEVICE — SOLUTION IV IRRIG POUR BRL 0.9% SODIUM CHL 2F7124

## (undated) DEVICE — STRIP SKIN CLSR W0.25XL4IN WHT SPUNBOUND FBR NYL HI ADH

## (undated) DEVICE — GLOVE ORANGE PI 7 1/2   MSG9075

## (undated) DEVICE — SUTURE PROL SZ 0 L30IN NONABSORBABLE BLU L36MM CT-1 1/2 CIR 8424H

## (undated) DEVICE — LINE SAMP O2 6.5FT W/FEMALE CONN F/ADULT CAPNOLINE PLUS

## (undated) DEVICE — 9165 UNIVERSAL PATIENT PLATE: Brand: 3M™

## (undated) DEVICE — TUBING, SUCTION, 3/16" X 20', STRAIGHT: Brand: MEDLINE

## (undated) DEVICE — COVER LT HNDL BLU PLAS

## (undated) DEVICE — SPONGE LAP W18XL18IN WHT COT 4 PLY FLD STRUNG RADPQ DISP ST

## (undated) DEVICE — 60 ML SYRINGE REGULAR TIP: Brand: MONOJECT

## (undated) DEVICE — BASIN SET: Brand: MEDLINE INDUSTRIES, INC.

## (undated) DEVICE — GLOVE SURG SZ 65 THK91MIL LTX FREE SYN POLYISOPRENE

## (undated) DEVICE — SUTURE COAT VCRL SZ 4-0 L18IN ABSRB UD L16MM PC-3 3/8 CIR J845G

## (undated) DEVICE — GAUZE,SPONGE,2"X2",8PLY,STERILE,LF,2'S: Brand: MEDLINE

## (undated) DEVICE — DISCONTINUED USE 419147 KIT ENDOSCOPY CUSTOM PACK

## (undated) DEVICE — PACK SURG PROC REMINDER N WVN DISPOSABLE BEAC TIME OUT

## (undated) DEVICE — GOWN,AURORA,NON-REINFORCED,2XL: Brand: MEDLINE

## (undated) DEVICE — SUTURE VCRL SZ 3-0 L27IN ABSRB UD L26MM CT-2 1/2 CIR J232H

## (undated) DEVICE — PACK,LAPAROTOMY,PK IV,AURORA: Brand: MEDLINE

## (undated) DEVICE — CONNECTOR TBNG AUX H2O JET DISP FOR OLY 160/180 SER

## (undated) DEVICE — BLADE CLIPPER GEN PURP NS

## (undated) DEVICE — CHLORAPREP 26ML ORANGE

## (undated) DEVICE — MARKER W/PRE PRINTED CUSTOM LABEL